# Patient Record
Sex: MALE | Race: WHITE | Employment: FULL TIME | URBAN - METROPOLITAN AREA
[De-identification: names, ages, dates, MRNs, and addresses within clinical notes are randomized per-mention and may not be internally consistent; named-entity substitution may affect disease eponyms.]

---

## 2021-11-01 ENCOUNTER — APPOINTMENT (OUTPATIENT)
Dept: GENERAL RADIOLOGY | Age: 36
End: 2021-11-01
Payer: COMMERCIAL

## 2021-11-01 ENCOUNTER — HOSPITAL ENCOUNTER (EMERGENCY)
Age: 36
Discharge: HOME | End: 2021-11-01
Payer: COMMERCIAL

## 2021-11-01 VITALS
SYSTOLIC BLOOD PRESSURE: 158 MMHG | BODY MASS INDEX: 30.56 KG/M2 | HEART RATE: 74 BPM | OXYGEN SATURATION: 97 % | RESPIRATION RATE: 16 BRPM | DIASTOLIC BLOOD PRESSURE: 97 MMHG | HEIGHT: 75 IN | WEIGHT: 245.81 LBS | TEMPERATURE: 97.4 F

## 2021-11-01 DIAGNOSIS — I10 ELEVATED BLOOD PRESSURE READING IN OFFICE WITH DIAGNOSIS OF HYPERTENSION: Primary | ICD-10-CM

## 2021-11-01 LAB
ANION GAP SERPL CALCULATED.3IONS-SCNC: 14 MMOL/L (ref 3–16)
BASOPHILS ABSOLUTE: 0 K/UL (ref 0–0.2)
BASOPHILS RELATIVE PERCENT: 0.6 %
BUN BLDV-MCNC: 11 MG/DL (ref 7–20)
CALCIUM SERPL-MCNC: 9.1 MG/DL (ref 8.3–10.6)
CHLORIDE BLD-SCNC: 105 MMOL/L (ref 99–110)
CO2: 20 MMOL/L (ref 21–32)
CREAT SERPL-MCNC: 0.7 MG/DL (ref 0.9–1.3)
EKG ATRIAL RATE: 62 BPM
EKG DIAGNOSIS: NORMAL
EKG P AXIS: 31 DEGREES
EKG P-R INTERVAL: 192 MS
EKG Q-T INTERVAL: 390 MS
EKG QRS DURATION: 104 MS
EKG QTC CALCULATION (BAZETT): 395 MS
EKG R AXIS: -13 DEGREES
EKG T AXIS: 8 DEGREES
EKG VENTRICULAR RATE: 62 BPM
EOSINOPHILS ABSOLUTE: 0.2 K/UL (ref 0–0.6)
EOSINOPHILS RELATIVE PERCENT: 2.5 %
GFR AFRICAN AMERICAN: >60
GFR NON-AFRICAN AMERICAN: >60
GLUCOSE BLD-MCNC: 72 MG/DL (ref 70–99)
HCT VFR BLD CALC: 46.4 % (ref 40.5–52.5)
HEMOGLOBIN: 15.1 G/DL (ref 13.5–17.5)
LYMPHOCYTES ABSOLUTE: 2.1 K/UL (ref 1–5.1)
LYMPHOCYTES RELATIVE PERCENT: 27 %
MCH RBC QN AUTO: 27.6 PG (ref 26–34)
MCHC RBC AUTO-ENTMCNC: 32.5 G/DL (ref 31–36)
MCV RBC AUTO: 84.9 FL (ref 80–100)
MONOCYTES ABSOLUTE: 0.7 K/UL (ref 0–1.3)
MONOCYTES RELATIVE PERCENT: 8.4 %
NEUTROPHILS ABSOLUTE: 4.8 K/UL (ref 1.7–7.7)
NEUTROPHILS RELATIVE PERCENT: 61.5 %
PDW BLD-RTO: 14.4 % (ref 12.4–15.4)
PLATELET # BLD: 198 K/UL (ref 135–450)
PMV BLD AUTO: 9.2 FL (ref 5–10.5)
POTASSIUM SERPL-SCNC: 4.1 MMOL/L (ref 3.5–5.1)
RBC # BLD: 5.47 M/UL (ref 4.2–5.9)
SODIUM BLD-SCNC: 139 MMOL/L (ref 136–145)
TROPONIN: <0.01 NG/ML
WBC # BLD: 7.8 K/UL (ref 4–11)

## 2021-11-01 PROCEDURE — 36415 COLL VENOUS BLD VENIPUNCTURE: CPT

## 2021-11-01 PROCEDURE — 93005 ELECTROCARDIOGRAM TRACING: CPT | Performed by: PHYSICIAN ASSISTANT

## 2021-11-01 PROCEDURE — 85025 COMPLETE CBC W/AUTO DIFF WBC: CPT

## 2021-11-01 PROCEDURE — 84484 ASSAY OF TROPONIN QUANT: CPT

## 2021-11-01 PROCEDURE — 99284 EMERGENCY DEPT VISIT MOD MDM: CPT

## 2021-11-01 PROCEDURE — 80048 BASIC METABOLIC PNL TOTAL CA: CPT

## 2021-11-01 PROCEDURE — 93010 ELECTROCARDIOGRAM REPORT: CPT | Performed by: INTERNAL MEDICINE

## 2021-11-01 PROCEDURE — 71046 X-RAY EXAM CHEST 2 VIEWS: CPT

## 2021-11-01 RX ORDER — ESCITALOPRAM OXALATE 5 MG/1
5 TABLET ORAL DAILY
COMMUNITY

## 2021-11-01 RX ORDER — M-VIT,TX,IRON,MINS/CALC/FOLIC 27MG-0.4MG
1 TABLET ORAL DAILY
COMMUNITY

## 2021-11-01 RX ORDER — METOPROLOL SUCCINATE 50 MG/1
25 TABLET, EXTENDED RELEASE ORAL 2 TIMES DAILY
COMMUNITY

## 2021-11-01 ASSESSMENT — PAIN DESCRIPTION - LOCATION
LOCATION: NECK

## 2021-11-01 ASSESSMENT — ENCOUNTER SYMPTOMS
BACK PAIN: 0
FACIAL SWELLING: 0
NAUSEA: 0
ABDOMINAL PAIN: 0
SHORTNESS OF BREATH: 0
VOMITING: 0
APNEA: 0
EYE DISCHARGE: 0
EYE REDNESS: 0
CHOKING: 0

## 2021-11-01 ASSESSMENT — PAIN SCALES - GENERAL
PAINLEVEL_OUTOF10: 3
PAINLEVEL_OUTOF10: 1
PAINLEVEL_OUTOF10: 1

## 2021-11-01 ASSESSMENT — PAIN DESCRIPTION - DESCRIPTORS
DESCRIPTORS: THROBBING;PRESSURE
DESCRIPTORS: PRESSURE;THROBBING
DESCRIPTORS: PRESSURE;THROBBING

## 2021-11-01 NOTE — ED PROVIDER NOTES
**ADVANCED PRACTICE PROVIDER, I HAVE EVALUATED THIS PATIENT**        [address]      Pt Name: Katy Garcia  HOP:[id number]  [place] 1985  Date of evaluation: 11/1/2021  Provider: Nicol Hardy PA-C      Chief Complaint:    Chief Complaint   Patient presents with    Hypertension     Has had increased BP for the last week and PCP increased his medication but it has still been over 848 systolic. Nursing Notes, Past Medical Hx, Past Surgical Hx, Social Hx, Allergies, and Family Hx were all reviewed and agreed with or any disagreements were addressed in the HPI.    HPI: (Location, Duration, Timing, Severity, Quality, Assoc Sx, Context, Modifying factors)    This is a  39 y.o. male who presents to the emergency room complaint of elevated blood pressure. He is on metoprolol. Supposed to be on 50 mg that he takes twice a day. But he has been taken over the last couple days 50 mg 3-4 times a day because it at home his blood pressure has been elevated. He called his primary doctor office today and they told him to come to emergency room. He denies chest pain but he said he felt a little pressure. No headaches. No visual changes. No extremity pain or weakness. He is ambulatory. He is only on metoprolol for his blood pressure. And no other complaints. Denies smoke use. PastMedical/Surgical History:      Diagnosis Date    HTN (hypertension)      History reviewed. No pertinent surgical history.     Medications:  Discharge Medication List as of 11/1/2021  2:31 PM      CONTINUE these medications which have NOT CHANGED    Details   metoprolol succinate (TOPROL XL) 50 MG extended release tablet Take 25 mg by mouth 2 times dailyHistorical Med      escitalopram (LEXAPRO) 5 MG tablet Take 5 mg by mouth dailyHistorical Med      Multiple Vitamins-Minerals (THERAPEUTIC MULTIVITAMIN-MINERALS) tablet Take 1 tablet by mouth dailyHistorical Med               Review of Systems:  (2-9 systems needed)  Review of Systems   Constitutional: Negative for chills and fever. HENT: Negative for congestion, facial swelling and sneezing. Eyes: Negative for discharge and redness. Respiratory: Negative for apnea, choking and shortness of breath. Cardiovascular: Negative for chest pain. Gastrointestinal: Negative for abdominal pain, nausea and vomiting. Genitourinary: Negative for dysuria. Musculoskeletal: Negative for back pain, neck pain and neck stiffness. Neurological: Negative for dizziness, tremors, seizures and headaches. All other systems reviewed and are negative. \"Positives and Pertinent negatives as per HPI\"    Physical Exam:  Physical Exam  Vitals and nursing note reviewed. HENT:      Mouth/Throat:      Mouth: Mucous membranes are dry. Pharynx: Oropharynx is clear. No oropharyngeal exudate or posterior oropharyngeal erythema. Eyes:      Extraocular Movements: Extraocular movements intact. Conjunctiva/sclera: Conjunctivae normal.      Pupils: Pupils are equal, round, and reactive to light. Cardiovascular:      Rate and Rhythm: Normal rate and regular rhythm. Heart sounds: Normal heart sounds. No murmur heard. No friction rub. No gallop. Pulmonary:      Effort: Pulmonary effort is normal. No respiratory distress. Breath sounds: Normal breath sounds. No wheezing or rales. Chest:      Chest wall: No tenderness. Abdominal:      General: Abdomen is flat. There is no distension. Palpations: Abdomen is soft. There is no mass. Tenderness: There is no abdominal tenderness. There is no guarding or rebound. Neurological:      General: No focal deficit present.          MEDICAL DECISION MAKING    Vitals:    Vitals:    11/01/21 1401 11/01/21 1409 11/01/21 1411 11/01/21 1431   BP: (!) 163/110 (!) 158/89 (!) 158/89 (!) 158/97   Pulse:       Resp:       Temp:       TempSrc:       SpO2: room course: Patient on exam throat is clear. Cardiovascular regular rate rhythm, lungs are clear. No wheeze rales or rhonchi noted. No chest wall tenderness. Abdomen is soft nontender. Normal bowel sounds all 4 quadrant. Bilateral lower extremities show no edema. Patient is alert oriented x4. Is ambulatory with no difficulty. Does not appear to be in acute distress. Result from today shows:  CBC within normal limits with a white count of 7.8. BMP unremarkable. Normal BUN at 11 and creatinine 0.7. Troponin less than 0.01    Chest x-ray shows no acute process. Patient blood pressure has normalized. At this point I will have him discharged. He was instructed to continue his metoprolol as prescribed only. Follow back up with his primary care physician. He'll be discharged stable condition. The patient tolerated their visit well. I evaluated the patient. The physician was available for consultation as needed. The patient and / or the family were informed of the results of any tests, a time was given to answer questions, a plan was proposed and they agreed with plan. CLINICAL IMPRESSION:  1.  Elevated blood pressure reading in office with diagnosis of hypertension        DISPOSITION  DISPOSITION Decision To Discharge 11/01/2021 02:47:00 PM        PATIENT REFERRED TO:  Sara Yeager MD  [address] [facility]  [address] [address]  [phone]    Call in 1 day        DISCHARGE MEDICATIONS:  Discharge Medication List as of 11/1/2021  2:31 PM          DISCONTINUED MEDICATIONS:  Discharge Medication List as of 11/1/2021  2:31 PM                 (Please note the MDM and HPI sections of this note were completed with a voice recognition program.  Efforts were made to edit the dictations but occasionally words are mis-transcribed.)    Electronically signed, Leander Hussein PA-C,          Leander Hussein PA-C  11/03/21 4026